# Patient Record
(demographics unavailable — no encounter records)

---

## 2025-07-24 NOTE — PHYSICAL EXAM
[de-identified] : Today he is nontender about the entire foot and ankle.  Grossly intact range of motion.  No pain with squeeze of the metatarsal heads together.  Negative Steve's click.  Neurovascular intact distally.

## 2025-07-24 NOTE — HISTORY OF PRESENT ILLNESS
[de-identified] : This the patient have seen before for his plantar fascia-itis presents with a new issue.  When he wears his shoes he felt pain at the interspaces between the second third and as well as the 3rd and 4th metatarsal heads.  Taking of the shoe was since resolved the pain.  Since he started wearing more reasonable shoes he has not had the pain.  Describes a burning sensation.

## 2025-07-24 NOTE — DISCUSSION/SUMMARY
[de-identified] : I discussed the patient's clinical radiographic findings with him.  Based on his clinical history I do think the patient was describing pain secondary to neuromas.  Now that he has resolved the issue by avoiding certain shoes I do not think there is any need for further workup.  Should he continue to develop pain we will consider advanced imaging in the form of an ultrasound or MRI.  He is in agreement with this plan.  All questions sought and answered.

## 2025-07-24 NOTE — DATA REVIEWED
[FreeTextEntry1] : X-rays obtained today demonstrate no evidence of fracture dislocation or malalignment.